# Patient Record
Sex: FEMALE | Race: WHITE | Employment: OTHER | URBAN - METROPOLITAN AREA
[De-identification: names, ages, dates, MRNs, and addresses within clinical notes are randomized per-mention and may not be internally consistent; named-entity substitution may affect disease eponyms.]

---

## 2017-03-16 ENCOUNTER — HOSPITAL ENCOUNTER (OUTPATIENT)
Dept: MAMMOGRAPHY | Facility: HOSPITAL | Age: 82
Discharge: HOME | End: 2017-03-16
Attending: SURGERY
Payer: MEDICARE

## 2017-03-16 DIAGNOSIS — N64.4 BREAST PAIN, RIGHT: ICD-10-CM

## 2017-03-16 PROCEDURE — 77066 DX MAMMO INCL CAD BI: CPT

## 2017-03-16 PROCEDURE — 76642 ULTRASOUND BREAST LIMITED: CPT

## 2017-03-16 PROCEDURE — 77062 BREAST TOMOSYNTHESIS BI: CPT

## 2017-11-14 ENCOUNTER — HOSPITAL ENCOUNTER (OUTPATIENT)
Dept: ULTRASOUND IMAGING | Facility: HOSPITAL | Age: 82
Discharge: HOME | End: 2017-11-14
Attending: FAMILY MEDICINE
Payer: MEDICARE

## 2017-11-14 DIAGNOSIS — R25.2 CRAMPS OF LEFT LOWER EXTREMITY: ICD-10-CM

## 2017-11-14 DIAGNOSIS — G54.0 BRACHIAL PLEXUS DISORDERS: ICD-10-CM

## 2017-11-14 PROCEDURE — 93923 UPR/LXTR ART STDY 3+ LVLS: CPT | Performed by: FAMILY MEDICINE

## 2017-11-20 ENCOUNTER — HOSPITAL ENCOUNTER (OUTPATIENT)
Dept: BONE DENSITY | Age: 82
Discharge: HOME | End: 2017-11-20
Attending: OBSTETRICS & GYNECOLOGY
Payer: MEDICARE

## 2017-11-20 DIAGNOSIS — M81.0 OSTEOPOROSIS: ICD-10-CM

## 2017-11-20 PROCEDURE — 77080 DXA BONE DENSITY AXIAL: CPT | Performed by: OBSTETRICS & GYNECOLOGY

## 2018-05-21 ENCOUNTER — HOSPITAL ENCOUNTER (OUTPATIENT)
Dept: MAMMOGRAPHY | Facility: HOSPITAL | Age: 83
Discharge: HOME | End: 2018-05-21
Attending: SURGERY
Payer: MEDICARE

## 2018-05-21 DIAGNOSIS — N64.4 MASTODYNIA OF RIGHT BREAST: ICD-10-CM

## 2018-05-21 PROCEDURE — 77062 BREAST TOMOSYNTHESIS BI: CPT | Performed by: SURGERY

## 2018-05-21 PROCEDURE — 77066 DX MAMMO INCL CAD BI: CPT | Performed by: SURGERY

## 2018-05-21 PROCEDURE — 76642 ULTRASOUND BREAST LIMITED: CPT | Performed by: SURGERY

## 2018-11-28 ENCOUNTER — HOSPITAL ENCOUNTER (OUTPATIENT)
Dept: ULTRASOUND IMAGING | Facility: HOSPITAL | Age: 83
Discharge: HOME | End: 2018-11-28
Attending: ORTHOPAEDIC SURGERY
Payer: MEDICARE

## 2018-11-28 DIAGNOSIS — R22.41 LOCALIZED SWELLING, MASS AND LUMP, LOWER LIMB, RIGHT: ICD-10-CM

## 2018-11-28 DIAGNOSIS — Z96.641 PRESENCE OF RIGHT ARTIFICIAL HIP JOINT: ICD-10-CM

## 2018-12-03 ENCOUNTER — HOSPITAL ENCOUNTER (OUTPATIENT)
Dept: ULTRASOUND IMAGING | Facility: HOSPITAL | Age: 83
Discharge: HOME | End: 2018-12-03
Attending: ORTHOPAEDIC SURGERY
Payer: MEDICARE

## 2018-12-03 PROCEDURE — 93971 EXTREMITY STUDY: CPT | Performed by: ORTHOPAEDIC SURGERY

## 2018-12-18 ENCOUNTER — TELEPHONE (OUTPATIENT)
Dept: MEDSURG UNIT | Facility: HOSPITAL | Age: 83
End: 2018-12-18

## 2018-12-18 ENCOUNTER — HOSPITAL ENCOUNTER (OUTPATIENT)
Dept: LAB | Facility: HOSPITAL | Age: 83
Discharge: HOME | End: 2018-12-18
Attending: ORTHOPAEDIC SURGERY
Payer: MEDICARE

## 2018-12-18 DIAGNOSIS — Z79.01 LONG TERM (CURRENT) USE OF ANTICOAGULANTS: ICD-10-CM

## 2018-12-18 DIAGNOSIS — Z79.01 LONG TERM (CURRENT) USE OF ANTICOAGULANTS: Primary | ICD-10-CM

## 2018-12-18 DIAGNOSIS — Z86.718 HISTORY OF DVT (DEEP VEIN THROMBOSIS): ICD-10-CM

## 2018-12-18 PROCEDURE — 85610 PROTHROMBIN TIME: CPT

## 2018-12-18 NOTE — TELEPHONE ENCOUNTER
History of DVT. Will be on warfarin for at least 6 months.   INR flow sheet maintained at outside hospital

## 2018-12-21 ENCOUNTER — HOSPITAL ENCOUNTER (OUTPATIENT)
Dept: LAB | Facility: HOSPITAL | Age: 83
Discharge: HOME | End: 2018-12-21
Attending: ORTHOPAEDIC SURGERY
Payer: MEDICARE

## 2018-12-21 DIAGNOSIS — Z86.718 HISTORY OF DVT (DEEP VEIN THROMBOSIS): ICD-10-CM

## 2018-12-21 DIAGNOSIS — Z79.01 LONG TERM (CURRENT) USE OF ANTICOAGULANTS: ICD-10-CM

## 2018-12-21 PROCEDURE — 85610 PROTHROMBIN TIME: CPT

## 2018-12-26 ENCOUNTER — HOSPITAL ENCOUNTER (OUTPATIENT)
Dept: LAB | Facility: HOSPITAL | Age: 83
Discharge: HOME | End: 2018-12-26
Attending: NURSE PRACTITIONER
Payer: MEDICARE

## 2018-12-26 DIAGNOSIS — Z86.718 HISTORY OF DVT (DEEP VEIN THROMBOSIS): ICD-10-CM

## 2018-12-26 DIAGNOSIS — Z79.01 LONG TERM (CURRENT) USE OF ANTICOAGULANTS: ICD-10-CM

## 2018-12-26 PROCEDURE — 85610 PROTHROMBIN TIME: CPT

## 2019-01-02 ENCOUNTER — HOSPITAL ENCOUNTER (OUTPATIENT)
Dept: LAB | Facility: HOSPITAL | Age: 84
Discharge: HOME | End: 2019-01-02
Attending: FAMILY MEDICINE
Payer: MEDICARE

## 2019-01-02 DIAGNOSIS — Z86.718 HISTORY OF DVT (DEEP VEIN THROMBOSIS): ICD-10-CM

## 2019-01-02 DIAGNOSIS — Z79.01 LONG TERM (CURRENT) USE OF ANTICOAGULANTS: ICD-10-CM

## 2019-01-02 LAB — POC INR: 2 (ref 0.8–1.3)

## 2019-01-02 PROCEDURE — 85610 PROTHROMBIN TIME: CPT

## 2019-01-08 ENCOUNTER — HOSPITAL ENCOUNTER (OUTPATIENT)
Dept: LAB | Facility: HOSPITAL | Age: 84
Discharge: HOME | End: 2019-01-08
Attending: NURSE PRACTITIONER
Payer: MEDICARE

## 2019-01-08 DIAGNOSIS — Z86.718 HISTORY OF DVT (DEEP VEIN THROMBOSIS): ICD-10-CM

## 2019-01-08 DIAGNOSIS — Z79.01 LONG TERM (CURRENT) USE OF ANTICOAGULANTS: ICD-10-CM

## 2019-01-08 LAB — POC INR: 1.6 (ref 0.8–1.3)

## 2019-01-08 PROCEDURE — 85610 PROTHROMBIN TIME: CPT

## 2019-01-11 ENCOUNTER — HOSPITAL ENCOUNTER (OUTPATIENT)
Dept: LAB | Facility: HOSPITAL | Age: 84
Discharge: HOME | End: 2019-01-11
Attending: NURSE PRACTITIONER
Payer: MEDICARE

## 2019-01-11 DIAGNOSIS — Z86.718 HISTORY OF DVT (DEEP VEIN THROMBOSIS): ICD-10-CM

## 2019-01-11 DIAGNOSIS — Z79.01 LONG TERM (CURRENT) USE OF ANTICOAGULANTS: ICD-10-CM

## 2019-01-11 LAB — POC INR: 2.1 (ref 0.8–1.3)

## 2019-01-11 PROCEDURE — 85610 PROTHROMBIN TIME: CPT

## 2019-01-15 ENCOUNTER — HOSPITAL ENCOUNTER (OUTPATIENT)
Dept: LAB | Facility: HOSPITAL | Age: 84
Discharge: HOME | End: 2019-01-15
Attending: NURSE PRACTITIONER
Payer: MEDICARE

## 2019-01-15 DIAGNOSIS — Z86.718 HISTORY OF DVT (DEEP VEIN THROMBOSIS): ICD-10-CM

## 2019-01-15 DIAGNOSIS — Z79.01 LONG TERM (CURRENT) USE OF ANTICOAGULANTS: ICD-10-CM

## 2019-01-15 LAB — POC INR: 2.9 (ref 0.8–1.3)

## 2019-01-15 PROCEDURE — 85610 PROTHROMBIN TIME: CPT

## 2019-01-22 ENCOUNTER — HOSPITAL ENCOUNTER (OUTPATIENT)
Dept: LAB | Facility: HOSPITAL | Age: 84
Discharge: HOME | End: 2019-01-22
Attending: NURSE PRACTITIONER
Payer: MEDICARE

## 2019-01-22 DIAGNOSIS — Z79.01 LONG TERM (CURRENT) USE OF ANTICOAGULANTS: ICD-10-CM

## 2019-01-22 DIAGNOSIS — Z86.718 HISTORY OF DVT (DEEP VEIN THROMBOSIS): ICD-10-CM

## 2019-01-22 LAB — POC INR: 1.8 (ref 0.8–1.3)

## 2019-01-22 PROCEDURE — 85610 PROTHROMBIN TIME: CPT

## 2019-01-29 ENCOUNTER — HOSPITAL ENCOUNTER (OUTPATIENT)
Dept: LAB | Facility: HOSPITAL | Age: 84
Discharge: HOME | End: 2019-01-29
Attending: NURSE PRACTITIONER
Payer: MEDICARE

## 2019-01-29 DIAGNOSIS — Z79.01 LONG TERM (CURRENT) USE OF ANTICOAGULANTS: ICD-10-CM

## 2019-01-29 DIAGNOSIS — Z86.718 HISTORY OF DVT (DEEP VEIN THROMBOSIS): ICD-10-CM

## 2019-01-29 LAB — POC INR: 1.7 (ref 0.8–1.3)

## 2019-01-29 PROCEDURE — 85610 PROTHROMBIN TIME: CPT

## 2019-02-05 ENCOUNTER — HOSPITAL ENCOUNTER (OUTPATIENT)
Dept: LAB | Facility: HOSPITAL | Age: 84
Discharge: HOME | End: 2019-02-05
Attending: NURSE PRACTITIONER
Payer: MEDICARE

## 2019-02-05 DIAGNOSIS — Z79.01 LONG TERM (CURRENT) USE OF ANTICOAGULANTS: ICD-10-CM

## 2019-02-05 DIAGNOSIS — Z86.718 HISTORY OF DVT (DEEP VEIN THROMBOSIS): ICD-10-CM

## 2019-02-05 LAB — POC INR: 2.1 (ref 0.8–1.3)

## 2019-02-05 PROCEDURE — 85610 PROTHROMBIN TIME: CPT

## 2019-02-12 ENCOUNTER — HOSPITAL ENCOUNTER (OUTPATIENT)
Dept: LAB | Facility: HOSPITAL | Age: 84
Discharge: HOME | End: 2019-02-12
Attending: NURSE PRACTITIONER
Payer: MEDICARE

## 2019-02-12 DIAGNOSIS — Z79.01 LONG TERM (CURRENT) USE OF ANTICOAGULANTS: ICD-10-CM

## 2019-02-12 DIAGNOSIS — Z86.718 HISTORY OF DVT (DEEP VEIN THROMBOSIS): ICD-10-CM

## 2019-02-12 LAB — POC INR: 2.7 (ref 0.8–1.3)

## 2019-02-12 PROCEDURE — 85610 PROTHROMBIN TIME: CPT

## 2019-02-19 ENCOUNTER — HOSPITAL ENCOUNTER (OUTPATIENT)
Dept: LAB | Facility: HOSPITAL | Age: 84
Discharge: HOME | End: 2019-02-19
Attending: NURSE PRACTITIONER
Payer: MEDICARE

## 2019-02-19 DIAGNOSIS — Z79.01 LONG TERM (CURRENT) USE OF ANTICOAGULANTS: ICD-10-CM

## 2019-02-19 DIAGNOSIS — Z86.718 HISTORY OF DVT (DEEP VEIN THROMBOSIS): ICD-10-CM

## 2019-02-19 LAB — POC INR: 2.3 (ref 0.8–1.3)

## 2019-02-19 PROCEDURE — 85610 PROTHROMBIN TIME: CPT

## 2019-02-26 ENCOUNTER — HOSPITAL ENCOUNTER (OUTPATIENT)
Dept: LAB | Facility: HOSPITAL | Age: 84
Discharge: HOME | End: 2019-02-26
Attending: NURSE PRACTITIONER
Payer: MEDICARE

## 2019-02-26 DIAGNOSIS — Z86.718 HISTORY OF DVT (DEEP VEIN THROMBOSIS): ICD-10-CM

## 2019-02-26 DIAGNOSIS — Z79.01 LONG TERM (CURRENT) USE OF ANTICOAGULANTS: ICD-10-CM

## 2019-02-26 LAB — POC INR: 2.7 (ref 0.8–1.3)

## 2019-02-26 PROCEDURE — 85610 PROTHROMBIN TIME: CPT

## 2019-03-05 ENCOUNTER — HOSPITAL ENCOUNTER (OUTPATIENT)
Dept: LAB | Facility: HOSPITAL | Age: 84
Discharge: HOME | End: 2019-03-05
Attending: NURSE PRACTITIONER
Payer: MEDICARE

## 2019-03-05 DIAGNOSIS — Z86.718 HISTORY OF DVT (DEEP VEIN THROMBOSIS): ICD-10-CM

## 2019-03-05 DIAGNOSIS — Z79.01 LONG TERM (CURRENT) USE OF ANTICOAGULANTS: ICD-10-CM

## 2019-03-05 LAB — POC INR: 2.5 (ref 0.8–1.3)

## 2019-03-05 PROCEDURE — 85610 PROTHROMBIN TIME: CPT

## 2019-03-07 ENCOUNTER — HOSPITAL ENCOUNTER (OUTPATIENT)
Dept: ULTRASOUND IMAGING | Facility: HOSPITAL | Age: 84
Discharge: HOME | End: 2019-03-07
Attending: NURSE PRACTITIONER
Payer: MEDICARE

## 2019-03-07 DIAGNOSIS — Z86.718 HISTORY OF DVT (DEEP VEIN THROMBOSIS): ICD-10-CM

## 2019-03-07 PROCEDURE — 93971 EXTREMITY STUDY: CPT | Performed by: NURSE PRACTITIONER

## 2019-04-09 ENCOUNTER — HOSPITAL ENCOUNTER (OUTPATIENT)
Dept: CV DIAGNOSTICS | Facility: HOSPITAL | Age: 84
Discharge: HOME | End: 2019-04-09
Attending: NURSE PRACTITIONER
Payer: MEDICARE

## 2019-04-09 DIAGNOSIS — I49.9 IRREGULAR HEART BEATS: ICD-10-CM

## 2019-04-09 DIAGNOSIS — R42 EPISODIC LIGHTHEADEDNESS: ICD-10-CM

## 2019-04-09 DIAGNOSIS — R40.4 TRANSIENT ALTERATION OF AWARENESS: ICD-10-CM

## 2019-04-09 PROCEDURE — 93227 XTRNL ECG REC<48 HR R&I: CPT | Performed by: NURSE PRACTITIONER

## 2019-04-09 PROCEDURE — 93226 XTRNL ECG REC<48 HR SCAN A/R: CPT | Performed by: NURSE PRACTITIONER

## 2019-04-09 PROCEDURE — 93225 XTRNL ECG REC<48 HRS REC: CPT | Performed by: NURSE PRACTITIONER

## 2019-05-02 ENCOUNTER — HOSPITAL ENCOUNTER (OUTPATIENT)
Dept: ULTRASOUND IMAGING | Facility: HOSPITAL | Age: 84
Discharge: HOME | End: 2019-05-02
Attending: FAMILY MEDICINE
Payer: MEDICARE

## 2019-05-02 DIAGNOSIS — R42 DIZZINESS: ICD-10-CM

## 2019-05-02 PROCEDURE — 93880 EXTRACRANIAL BILAT STUDY: CPT | Performed by: FAMILY MEDICINE

## 2019-05-15 ENCOUNTER — HOSPITAL ENCOUNTER (OUTPATIENT)
Dept: MAMMOGRAPHY | Facility: HOSPITAL | Age: 84
Discharge: HOME | End: 2019-05-15
Attending: SURGERY
Payer: MEDICARE

## 2019-05-15 DIAGNOSIS — Z12.31 ENCOUNTER FOR SCREENING MAMMOGRAM FOR MALIGNANT NEOPLASM OF BREAST: ICD-10-CM

## 2019-05-15 PROCEDURE — 77067 SCR MAMMO BI INCL CAD: CPT | Performed by: SURGERY

## 2019-05-15 PROCEDURE — 77063 BREAST TOMOSYNTHESIS BI: CPT | Performed by: SURGERY

## 2019-06-25 ENCOUNTER — HOSPITAL ENCOUNTER (OUTPATIENT)
Dept: MRI IMAGING | Age: 84
Discharge: HOME | End: 2019-06-25
Attending: FAMILY MEDICINE
Payer: MEDICARE

## 2019-06-25 DIAGNOSIS — R32 UNSPECIFIED URINARY INCONTINENCE: ICD-10-CM

## 2019-06-25 DIAGNOSIS — R29.898 WEAKNESS OF RIGHT LOWER EXTREMITY: ICD-10-CM

## 2019-06-25 PROCEDURE — 72148 MRI LUMBAR SPINE W/O DYE: CPT | Performed by: FAMILY MEDICINE

## 2019-09-30 PROBLEM — M16.11 DEGENERATIVE JOINT DISEASE OF RIGHT HIP: Status: ACTIVE | Noted: 2018-11-09

## 2019-09-30 PROBLEM — E03.9 ACQUIRED HYPOTHYROIDISM: Status: ACTIVE | Noted: 2018-11-09

## 2019-09-30 PROBLEM — E78.2 MIXED HYPERLIPIDEMIA: Status: ACTIVE | Noted: 2018-11-09

## 2019-09-30 PROBLEM — M81.0 AGE RELATED OSTEOPOROSIS: Status: ACTIVE | Noted: 2018-11-09

## 2019-09-30 PROBLEM — E55.9 VITAMIN D DEFICIENCY: Status: ACTIVE | Noted: 2018-11-09

## 2019-09-30 PROBLEM — K58.9 IBS (IRRITABLE BOWEL SYNDROME): Status: ACTIVE | Noted: 2018-11-09

## 2019-09-30 PROBLEM — M25.551 HIP PAIN, RIGHT: Status: ACTIVE | Noted: 2018-07-10

## 2019-09-30 PROBLEM — K21.9 GASTROESOPHAGEAL REFLUX DISEASE WITHOUT ESOPHAGITIS: Status: ACTIVE | Noted: 2018-11-09

## 2019-09-30 PROBLEM — N90.4 LICHEN SCLEROSUS OF FEMALE GENITALIA: Status: ACTIVE | Noted: 2018-11-09

## 2019-11-21 ENCOUNTER — HOSPITAL ENCOUNTER (OUTPATIENT)
Dept: BONE DENSITY | Age: 84
Discharge: HOME | End: 2019-11-21
Attending: OBSTETRICS & GYNECOLOGY
Payer: MEDICARE

## 2019-11-21 DIAGNOSIS — M81.0 OSTEOPOROSIS: ICD-10-CM

## 2019-11-21 PROCEDURE — 77080 DXA BONE DENSITY AXIAL: CPT | Performed by: OBSTETRICS & GYNECOLOGY

## 2019-12-06 ENCOUNTER — HOSPITAL ENCOUNTER (OUTPATIENT)
Dept: ULTRASOUND IMAGING | Facility: HOSPITAL | Age: 84
Discharge: HOME | DRG: 391 | End: 2019-12-06
Attending: FAMILY MEDICINE
Payer: MEDICARE

## 2019-12-06 DIAGNOSIS — R60.0 BILATERAL LEG EDEMA: ICD-10-CM

## 2019-12-06 PROCEDURE — 93970 EXTREMITY STUDY: CPT | Performed by: FAMILY MEDICINE

## 2019-12-07 ENCOUNTER — APPOINTMENT (OUTPATIENT)
Dept: CT IMAGING | Facility: HOSPITAL | Age: 84
DRG: 391 | End: 2019-12-07
Attending: EMERGENCY MEDICINE
Payer: MEDICARE

## 2019-12-07 ENCOUNTER — HOSPITAL ENCOUNTER (INPATIENT)
Facility: HOSPITAL | Age: 84
LOS: 2 days | Discharge: HOME | DRG: 391 | End: 2019-12-11
Attending: EMERGENCY MEDICINE | Admitting: HOSPITALIST
Payer: MEDICARE

## 2019-12-07 DIAGNOSIS — K56.0 PARALYTIC ILEUS (HCC): Primary | ICD-10-CM

## 2019-12-07 DIAGNOSIS — R11.2 NAUSEA AND VOMITING IN ADULT: ICD-10-CM

## 2019-12-07 DIAGNOSIS — R10.13 ABDOMINAL PAIN, EPIGASTRIC: ICD-10-CM

## 2019-12-07 PROBLEM — E03.9 HYPOTHYROIDISM: Status: ACTIVE | Noted: 2018-11-09

## 2019-12-07 PROCEDURE — 74177 CT ABD & PELVIS W/CONTRAST: CPT | Performed by: EMERGENCY MEDICINE

## 2019-12-07 PROCEDURE — 99223 1ST HOSP IP/OBS HIGH 75: CPT | Performed by: HOSPITALIST

## 2019-12-07 RX ORDER — MORPHINE SULFATE 4 MG/ML
2 INJECTION, SOLUTION INTRAMUSCULAR; INTRAVENOUS EVERY 2 HOUR PRN
Status: DISCONTINUED | OUTPATIENT
Start: 2019-12-07 | End: 2019-12-11

## 2019-12-07 RX ORDER — MORPHINE SULFATE 4 MG/ML
1 INJECTION, SOLUTION INTRAMUSCULAR; INTRAVENOUS EVERY 2 HOUR PRN
Status: DISCONTINUED | OUTPATIENT
Start: 2019-12-07 | End: 2019-12-11

## 2019-12-07 RX ORDER — MORPHINE SULFATE 4 MG/ML
4 INJECTION, SOLUTION INTRAMUSCULAR; INTRAVENOUS EVERY 2 HOUR PRN
Status: DISCONTINUED | OUTPATIENT
Start: 2019-12-07 | End: 2019-12-11

## 2019-12-07 RX ORDER — MORPHINE SULFATE 4 MG/ML
2 INJECTION, SOLUTION INTRAMUSCULAR; INTRAVENOUS ONCE
Status: COMPLETED | OUTPATIENT
Start: 2019-12-07 | End: 2019-12-07

## 2019-12-07 RX ORDER — ACETAMINOPHEN AND CODEINE PHOSPHATE 300; 30 MG/1; MG/1
2 TABLET ORAL EVERY 4 HOURS PRN
Status: DISCONTINUED | OUTPATIENT
Start: 2019-12-07 | End: 2019-12-11

## 2019-12-07 RX ORDER — ONDANSETRON 2 MG/ML
4 INJECTION INTRAMUSCULAR; INTRAVENOUS EVERY 6 HOURS PRN
Status: DISCONTINUED | OUTPATIENT
Start: 2019-12-07 | End: 2019-12-11

## 2019-12-07 RX ORDER — SODIUM CHLORIDE 9 MG/ML
INJECTION, SOLUTION INTRAVENOUS CONTINUOUS
Status: DISCONTINUED | OUTPATIENT
Start: 2019-12-07 | End: 2019-12-11

## 2019-12-07 RX ORDER — ACETAMINOPHEN 325 MG/1
650 TABLET ORAL EVERY 4 HOURS PRN
Status: DISCONTINUED | OUTPATIENT
Start: 2019-12-07 | End: 2019-12-11

## 2019-12-07 RX ORDER — ONDANSETRON 2 MG/ML
4 INJECTION INTRAMUSCULAR; INTRAVENOUS ONCE
Status: COMPLETED | OUTPATIENT
Start: 2019-12-07 | End: 2019-12-07

## 2019-12-07 RX ORDER — ACETAMINOPHEN AND CODEINE PHOSPHATE 300; 30 MG/1; MG/1
1 TABLET ORAL EVERY 4 HOURS PRN
Status: DISCONTINUED | OUTPATIENT
Start: 2019-12-07 | End: 2019-12-11

## 2019-12-07 RX ORDER — MORPHINE SULFATE 4 MG/ML
4 INJECTION, SOLUTION INTRAMUSCULAR; INTRAVENOUS ONCE
Status: DISCONTINUED | OUTPATIENT
Start: 2019-12-07 | End: 2019-12-07

## 2019-12-07 RX ORDER — METOCLOPRAMIDE HYDROCHLORIDE 5 MG/ML
5 INJECTION INTRAMUSCULAR; INTRAVENOUS EVERY 8 HOURS PRN
Status: DISCONTINUED | OUTPATIENT
Start: 2019-12-07 | End: 2019-12-11

## 2019-12-07 RX ORDER — LEVOTHYROXINE SODIUM 88 UG/1
88 TABLET ORAL
Status: DISCONTINUED | OUTPATIENT
Start: 2019-12-08 | End: 2019-12-11

## 2019-12-07 RX ORDER — ENOXAPARIN SODIUM 100 MG/ML
40 INJECTION SUBCUTANEOUS DAILY
Status: DISCONTINUED | OUTPATIENT
Start: 2019-12-08 | End: 2019-12-11

## 2019-12-07 RX ORDER — FAMOTIDINE 20 MG/1
20 TABLET ORAL NIGHTLY
COMMUNITY
End: 2020-01-08 | Stop reason: DRUGHIGH

## 2019-12-07 NOTE — ED PROVIDER NOTES
Patient Seen in: [facility] Emergency Department      History   Patient presents with:  Abdomen/Flank Pain    Stated Complaint: abdominal pain x1 hour    HPI    69-year-old female presents emergency department with a history of abdominal pain since Review of Systems    Positive for stated complaint: abdominal pain x1 hour  Other systems are as noted in HPI. Constitutional and vital signs reviewed. All other systems reviewed and negative except as noted above.     Physical Exam     ED Yimi Lofton WITH PLATELET.   Procedure                               Abnormality         Status                     ---------                               -----------         ------                     CBC W/ DIFFERENTIAL[[id number]]                              Final stable in the emergency department and will be transferred to floor for further definitive care. Patient's questions were answered.   Admission disposition: 12/7/2019  6:48 PM                   Disposition and Plan     Clinical Impression:  Paralytic ileus

## 2019-12-07 NOTE — ED INITIAL ASSESSMENT (HPI)
History of abdominal pain since September 2019. History of IBS and bacteria overgrowth in small intestine. Sudden onset of upper abdominal pain with nausea and some loose stools.

## 2019-12-08 PROCEDURE — 99232 SBSQ HOSP IP/OBS MODERATE 35: CPT | Performed by: HOSPITALIST

## 2019-12-08 RX ORDER — DICYCLOMINE HYDROCHLORIDE 10 MG/1
10 CAPSULE ORAL 3 TIMES DAILY PRN
Status: DISCONTINUED | OUTPATIENT
Start: 2019-12-08 | End: 2019-12-11

## 2019-12-08 NOTE — PROGRESS NOTES
[facility]  Progress Note     Carlos Eduardo Mckeon Patient Status:  Observation    7/15/1935 MRN [id number]   [facility] 3NW-A Attending Adrian Bajwa MD   Hosp Day # 0 PCP Vi Alvarez MD     Chief Complaint: abdominal pain    S: Daily       ASSESSMENT / PLAN:     1. SB enteritis  1. Sip of water okay  2. IVF  3. Pain medications  4. GI consult- follows with Dr. Jluis Lewis  2. H/o bacterial overgrowth  1. Off xifaman  3.  Hypothyroidism  1. synthroid    Plan of care: as above    Quality

## 2019-12-08 NOTE — H&P
[facility]  History and Physical     Dhaval Castro Patient Status:  Emergency    7/15/1935 MRN [id number]   Location [address] [facility] Attending Zonia Jordan MD   Hosp Day # 0 PCP Sydney Pitt MD     Chief Complaint: a HISTORY  15+    nose sx   • OTHER SURGICAL HISTORY      toe sx   • OTHER SURGICAL HISTORY      elbox sx   • SIGMOIDOSCOPY,DIAGNOSTIC     • TONSILLECTOMY     • TOTAL ABDOM HYSTERECTOMY  1986     Social History:  reports that she has never smoked.  She has ne /67   Pulse 83   Temp 97.6 °F (36.4 °C) (Temporal)   Resp 14   Ht 160 cm (5' 3\")   Wt 120 lb (54.4 kg)   SpO2 100%   BMI 21.26 kg/m²   General: No acute distress. Alert and oriented x 3. HEENT: Normocephalic, atraumatic. EOM-I. Anicteric.   Neck:

## 2019-12-08 NOTE — CONSULTS
[facility] Note:  Renal Dose Adjustment for Metoclopramide (REGLAN)    Debo Sullivan has been prescribed Metoclopramide (REGLAN) 10 mg every 8 hours as needed for n/v.    Estimated Creatinine Clearance: 33.6 mL/min (A) (based on SCr of 1.03 mg/dL (H

## 2019-12-08 NOTE — CONSULTS
[facility]                       [facility]    [facility]:  [facility]ation    7/15/1935 MRN [id number]   [facility] 3NW-A Attending Sheri Dickson MD   H History:   Procedure Laterality Date   • COLONOSCOPY  2015   • EGD     • ERCP,DIAGNOSTIC     • HIP REPLACEMENT SURGERY  11/13/2018   • HYSTERECTOMY  1985   • OOPHORECTOMY  1985   • OTHER SURGICAL HISTORY  15+    nose sx   • OTHER SURGICAL HISTORY      toe long after you took penicillin did the             reaction start?  Almost within 8 hours4.  What             happened when you took penicillin?  Anaphylactic             reaction5.  Since your reaction, have you taken             other beta-lactam antibiot (54.4 kg)   SpO2 100%   BMI 21.26 kg/m²   Gen: AAO x 3, able to speak in complete sentences  HENT: NCAT, EOMI, PERRL, oropharynx is clear with moist mucosal membranes  Eyes: Sclerae are anicteric  Neck:  Supple without nuchal rigidity;  No lymphadenopathy  Diverticulosis, Colon polyp (tubular adenoma), Colonic mucosa with increased eosinophils, suggestive of chronic colitis    Impression:   1. Abdominal pain with CT showing enteritis  2. H/o SIBO  3. Weight loss    Recommendations:   1.  Recommend GI stool p

## 2019-12-08 NOTE — PLAN OF CARE
Patient admitted via cart from the ED. Stable condition. Admission navigator completed. POC discussed. Oriented to room. Safety precautions initiated. Bed in low position. Call light in reach. Pt alert and oriented x 4.  Up with assist. Voiding absence of nausea and vomiting  Description  INTERVENTIONS:  - Maintain adequate hydration with IV or PO as ordered and tolerated  - Nasogastric tube to low intermittent suction as ordered  - Evaluate effectiveness of ordered antiemetic medications  - Prov

## 2019-12-08 NOTE — PLAN OF CARE
Problem: Patient/Family Goals  Goal: Patient/Family Long Term Goal  Description  Patient's Long Term Goal:    Interventions:  -   - See additional Care Plan goals for specific interventions  Outcome: Progressing  Goal: Patient/Family Short Term Goal  Dragan Establish a toileting routine/schedule  - Consider collaborating with pharmacy to review patient's medication profile  Outcome: Progressing  VSS afebrile. Denies nausea or emesis. States abdomen pain much less severe rating it 3-4/10.   Declined pain medic

## 2019-12-08 NOTE — ED NOTES
Patient is resting comfortably; denies pain/bertram/nausea. Adult family at bs. Awaiting inpt room assignment.

## 2019-12-09 ENCOUNTER — APPOINTMENT (OUTPATIENT)
Dept: CT IMAGING | Facility: HOSPITAL | Age: 84
DRG: 391 | End: 2019-12-09
Attending: NURSE PRACTITIONER
Payer: MEDICARE

## 2019-12-09 ENCOUNTER — APPOINTMENT (OUTPATIENT)
Dept: GENERAL RADIOLOGY | Facility: HOSPITAL | Age: 84
DRG: 391 | End: 2019-12-09
Attending: NURSE PRACTITIONER
Payer: MEDICARE

## 2019-12-09 PROCEDURE — 74177 CT ABD & PELVIS W/CONTRAST: CPT | Performed by: NURSE PRACTITIONER

## 2019-12-09 PROCEDURE — 99232 SBSQ HOSP IP/OBS MODERATE 35: CPT | Performed by: HOSPITALIST

## 2019-12-09 PROCEDURE — 74019 RADEX ABDOMEN 2 VIEWS: CPT | Performed by: NURSE PRACTITIONER

## 2019-12-09 NOTE — DIETARY MALNUTRITION NOTE
[facility]    NUTRITION INITIAL ASSESSMENT    Pt meets moderate (chronic) malnutrition criteria.     CRITERIA FOR MALNUTRITION DIAGNOSIS:  Criteria for non-severe malnutrition diagnosis: chronic illness related to energy intake less than75% for greater month  Intake Meeting Needs: No  Food Allergies: No  Cultural/Ethnic/Baptism Preferences Addresses: Yes    NUTRITION RELATED PHYSICAL FINDINGS:     1. Body Fat/Muscle Mass: mild depletion body fat and mild depletion muscle mass per visual exam.     2. Fl

## 2019-12-09 NOTE — PROGRESS NOTES
Gastroenterology Progress Note  Deuelfabienne Garay Patient Status:  Observation    7/15/1935 MRN [id number]   [facility] 3NW-A Attending Oneal Hewitt MD   [address] Day # 0 PCP Ramona Ruiz MD tolerated; out of bed throughout day   4. Antiemetics as needed; pain meds per PCP recommendations-limiting narcotics as able  5. Outpatient glucose breath test to assess for ongoing SIBO--was already ordered in outpt setting   6.  Continue dicyclomine PRN

## 2019-12-09 NOTE — PROGRESS NOTES
[facility]  Progress Note     [address] Patient Status:  Observation    7/15/1935 MRN [id number]   [facility] 3NW-A Attending Roge Daniel MD   Hosp Day # 0 PCP Rogerio Pérez MD     Chief Complaint: abdominal pain    S: Daily       ASSESSMENT / PLAN:     1. SB enteritis  1. On clear liquids  2. GI on consult  3. Plan for Obs series and possible CTE to assess small bowel  4. IVF  5. Pain medications  2. H/o bacterial overgrowth  1. Off xifaman  3.  Hypothyroidism  1. synthr

## 2019-12-09 NOTE — PLAN OF CARE
ABD SOFT AND DISTENDED. C/O C/O OF MILD ABD PAIN. DECLINED PAIN MEDS. REPORTS FLATUS ONCE ONLY. TOLERATING CL DIET. AMBULATING. POC UPDATED, PT VERBALIZED UNDERSTANDING.

## 2019-12-09 NOTE — PLAN OF CARE
Problem: PAIN - ADULT  Goal: Verbalizes/displays adequate comfort level or patient's stated pain goal  Description  INTERVENTIONS:  - Encourage pt to monitor pain and request assistance  - Assess pain using appropriate pain scale  - Administer analgesics b Waiting for stool sample. Pt is resting, call light in reach, will continue to monitor.

## 2019-12-10 PROCEDURE — 99232 SBSQ HOSP IP/OBS MODERATE 35: CPT | Performed by: HOSPITALIST

## 2019-12-10 NOTE — DIETARY MALNUTRITION NOTE
[facility]    NUTRITION INITIAL ASSESSMENT    Pt meets moderate (chronic) malnutrition criteria.     CRITERIA FOR MALNUTRITION DIAGNOSIS:  Criteria for non-severe malnutrition diagnosis: chronic illness related to energy intake less than75% for greater lb)  08/11/15 : 63.5 kg (140 lb)      NUTRITION:  Diet: Low fiber/Soft   Oral Supplements: n/a    FOOD/NUTRITION RELATED HISTORY:  Appetite: Poor and Fair  Intake: <75% x 1 month  Intake Meeting Needs: No  Food Allergies: No  Cultural/Ethnic/Amish Pref

## 2019-12-10 NOTE — PLAN OF CARE
Diet advanced to soft, pt ate only scrambled eggs, food choices limited as she follows FODMAP diet. Feels bloated & taking diet slowly, will order dinner but pt does not feel ready for discharge home tonight, afraid cramping may start again.

## 2019-12-10 NOTE — PROGRESS NOTES
Gastroenterology Progress Note  Christa Calle Patient Status:  Observation    7/15/1935 MRN [id number]   [facility] 3NW-A Attending Pilo Dawn MD   Hosp Day # 1 PCP Keysha Mendez MD

## 2019-12-10 NOTE — CDS QUERY
Potential for Impaired Nutritional Status  DOCUMENTATION CLARIFICATION FORM  Dear Doctor:  Clinical information documented by the Clinical Dietician suggests potential for impaired nutritional status.  For accurate ICD-10-CM code assignment to reflect sever Clinical Documentation :   Michael Santoyo RN CCDS x [id number]                          Thank you!                                                          THIS FORM IS A PERMANENT PART OF THE MEDICAL RECORD

## 2019-12-10 NOTE — PROGRESS NOTES
[facility]  Progress Note     Michelle Mariscal Patient Status:  Observation    7/15/1935 MRN [id number]   [facility] 3NW-A Attending Neena Rubin MD   Hosp Day # 1 PCP Neftali Davis MD     Chief Complaint: abdominal pain    S: data reviewed in Epic. Medications:   • Levothyroxine Sodium  88 mcg Oral Before breakfast   • enoxaparin  40 mg Subcutaneous Daily       ASSESSMENT / PLAN:     1. SB enteritis  1. On clear liquids  2. GI on consult  3.  CTE showing ileus and enteritis

## 2019-12-10 NOTE — PLAN OF CARE
Patient alert and oriented, resting comfortably in bed. CT scan performed tonight. Patient has had small bowel movments that have not been able to be collected, need to collect stool remains. No cough or SOB at this time.   Minimal stomach pain, dist pharmacy to review patient's medication profile  Outcome: Progressing

## 2019-12-11 VITALS
DIASTOLIC BLOOD PRESSURE: 51 MMHG | RESPIRATION RATE: 18 BRPM | BODY MASS INDEX: 21.26 KG/M2 | SYSTOLIC BLOOD PRESSURE: 123 MMHG | TEMPERATURE: 97 F | HEART RATE: 70 BPM | OXYGEN SATURATION: 95 % | HEIGHT: 63 IN | WEIGHT: 120 LBS

## 2019-12-11 PROCEDURE — 99239 HOSP IP/OBS DSCHRG MGMT >30: CPT | Performed by: HOSPITALIST

## 2019-12-11 RX ORDER — DICYCLOMINE HYDROCHLORIDE 10 MG/1
10 CAPSULE ORAL 3 TIMES DAILY PRN
Qty: 90 CAPSULE | Refills: 0 | Status: SHIPPED | OUTPATIENT
Start: 2019-12-11 | End: 2021-12-07 | Stop reason: ALTCHOICE

## 2019-12-11 NOTE — PLAN OF CARE
Patient alert and oriented x4. Denies pain. Denies nausea. States tolerating diet. Passing gas. States had small BM this AM, not seen by this RN. States that she still feels bloated, but bloating is improving. Pt is up ambulating independently. Voiding.  IV Evaluate effectiveness of ordered antiemetic medications  - Provide nonpharmacologic comfort measures as appropriate  - Advance diet as tolerated, if ordered  - Obtain nutritional consult as needed  - Evaluate fluid balance  Outcome: Progressing  Goal: Mata Catalan

## 2019-12-11 NOTE — PROGRESS NOTES
Patient discharged home at this time. All discharge instructions were reviewed with the patient. All questions and concerns were addressed. IV access was removed. Pt denied need for wheelchair, ambulated off unit with this RN, gait steady.  Pt left in stabl

## 2019-12-11 NOTE — PLAN OF CARE
Patient is alert and oriented. States that abdominal pain has improved and bloating is less. Ambulating to the restroom without assistance. No sob or cough, lungs are clear. No n/v/d.  VSS, will continue to monitor.      Problem: PAIN - ADULT  Goal: Sulaiman

## 2019-12-11 NOTE — PROGRESS NOTES
[facility]  Progress Note     Severiano Grieves Patient Status:  Observation    7/15/1935 MRN [id number]   [facility] 3NW-A Attending Michell Izaguirre MD   Hosp Day # 2 PCP Ricky Brown MD     Chief Complaint: abdominal pain    S: Levothyroxine Sodium  88 mcg Oral Before breakfast   • enoxaparin  40 mg Subcutaneous Daily       ASSESSMENT / PLAN:     1. SB enteritis  1. On clear liquids  2. GI on consult  3. CTE showing ileus and enteritis  4. Tolerating diet  2.  H/o bacterial overgr

## 2019-12-12 NOTE — DISCHARGE SUMMARY
[facility]  DISCHARGE SUMMARY     [address] Patient Status:  Inpatient    7/15/1935 MRN [id number]   [facility] 3NW-A Attending No att. providers found   Hosp Day # 2 PCP Rogerio Pérez MD     Date of Admission: 2019 Discharge:   · none    Consultants:  • Gastroenterology    Discharge Medication List:     Discharge Medications      START taking these medications      Instructions Prescription details   Dicyclomine HCl 10 MG Caps  Commonly known as:  BENTYL      Take 1 °C)  Pulse:  [70] 70  Resp:  [18] 18  BP: (123)/(51) 123/51    Physical Exam:    General: No acute distress. Respiratory: Clear to auscultation bilaterally. No wheezes. No rhonchi. Cardiovascular: S1, S2. Regular rate and rhythm.  No murmurs, rubs or gal

## 2020-01-23 ENCOUNTER — HOSPITAL ENCOUNTER (OUTPATIENT)
Dept: MRI IMAGING | Facility: HOSPITAL | Age: 85
Discharge: HOME | End: 2020-01-23
Attending: NURSE PRACTITIONER
Payer: MEDICARE

## 2020-01-23 DIAGNOSIS — R93.89 ABNORMAL FINDING ON IMAGING: ICD-10-CM

## 2020-01-23 DIAGNOSIS — K52.9 ENTERITIS: ICD-10-CM

## 2020-01-23 PROCEDURE — 72197 MRI PELVIS W/O & W/DYE: CPT | Performed by: NURSE PRACTITIONER

## 2020-01-23 PROCEDURE — A9575 INJ GADOTERATE MEGLUMI 0.1ML: HCPCS | Performed by: NURSE PRACTITIONER

## 2020-01-23 PROCEDURE — 74183 MRI ABD W/O CNTR FLWD CNTR: CPT | Performed by: NURSE PRACTITIONER

## 2020-03-01 ENCOUNTER — APPOINTMENT (OUTPATIENT)
Dept: LAB | Facility: HOSPITAL | Age: 85
End: 2020-03-01
Attending: INTERNAL MEDICINE
Payer: MEDICARE

## 2020-03-01 DIAGNOSIS — R10.9 ABDOMINAL PAIN, UNSPECIFIED ABDOMINAL LOCATION: ICD-10-CM

## 2020-03-01 DIAGNOSIS — R14.0 BLOATING: ICD-10-CM

## 2020-03-01 DIAGNOSIS — Z86.010 HISTORY OF ADENOMATOUS POLYP OF COLON: ICD-10-CM

## 2020-03-01 PROCEDURE — 82274 ASSAY TEST FOR BLOOD FECAL: CPT

## 2020-05-20 ENCOUNTER — HOSPITAL ENCOUNTER (OUTPATIENT)
Dept: MAMMOGRAPHY | Facility: HOSPITAL | Age: 85
Discharge: HOME | End: 2020-05-20
Attending: SURGERY
Payer: MEDICARE

## 2020-05-20 DIAGNOSIS — Z12.31 ENCOUNTER FOR SCREENING MAMMOGRAM FOR MALIGNANT NEOPLASM OF BREAST: ICD-10-CM

## 2020-05-20 PROCEDURE — 77067 SCR MAMMO BI INCL CAD: CPT | Performed by: SURGERY

## 2020-05-20 PROCEDURE — 77063 BREAST TOMOSYNTHESIS BI: CPT | Performed by: SURGERY

## 2020-07-03 ENCOUNTER — APPOINTMENT (OUTPATIENT)
Dept: LAB | Facility: HOSPITAL | Age: 85
End: 2020-07-03
Attending: INTERNAL MEDICINE
Payer: MEDICARE

## 2020-07-03 DIAGNOSIS — R19.5 LOOSE STOOLS: ICD-10-CM

## 2020-07-03 PROCEDURE — 87493 C DIFF AMPLIFIED PROBE: CPT

## 2020-07-04 LAB — C DIFF TOX B STL QL: NEGATIVE

## 2020-07-09 ENCOUNTER — LAB ENCOUNTER (OUTPATIENT)
Dept: LAB | Facility: HOSPITAL | Age: 85
End: 2020-07-09
Attending: INTERNAL MEDICINE
Payer: MEDICARE

## 2020-07-09 DIAGNOSIS — K21.9 GASTROESOPHAGEAL REFLUX DISEASE WITHOUT ESOPHAGITIS: ICD-10-CM

## 2020-07-09 DIAGNOSIS — R10.9 ABDOMINAL BLOATING WITH CRAMPS: ICD-10-CM

## 2020-07-09 DIAGNOSIS — R14.0 ABDOMINAL BLOATING WITH CRAMPS: ICD-10-CM

## 2020-07-09 DIAGNOSIS — R63.4 WEIGHT LOSS: ICD-10-CM

## 2020-07-09 LAB
ALBUMIN SERPL-MCNC: 3.3 G/DL (ref 3.4–5)
ALBUMIN/GLOB SERPL: 1 {RATIO} (ref 1–2)
ALP LIVER SERPL-CCNC: 64 U/L (ref 55–142)
ALT SERPL-CCNC: 20 U/L (ref 13–56)
ANION GAP SERPL CALC-SCNC: 1 MMOL/L (ref 0–18)
AST SERPL-CCNC: 18 U/L (ref 15–37)
BASOPHILS # BLD AUTO: 0.02 X10(3) UL (ref 0–0.2)
BASOPHILS NFR BLD AUTO: 0.4 %
BILIRUB SERPL-MCNC: 0.9 MG/DL (ref 0.1–2)
BUN BLD-MCNC: 19 MG/DL (ref 7–18)
BUN/CREAT SERPL: 20 (ref 10–20)
CALCIUM BLD-MCNC: 9.4 MG/DL (ref 8.5–10.1)
CHLORIDE SERPL-SCNC: 106 MMOL/L (ref 98–112)
CO2 SERPL-SCNC: 32 MMOL/L (ref 21–32)
CREAT BLD-MCNC: 0.95 MG/DL (ref 0.55–1.02)
DEPRECATED RDW RBC AUTO: 43.8 FL (ref 35.1–46.3)
EOSINOPHIL # BLD AUTO: 0.12 X10(3) UL (ref 0–0.7)
EOSINOPHIL NFR BLD AUTO: 2.2 %
ERYTHROCYTE [DISTWIDTH] IN BLOOD BY AUTOMATED COUNT: 12.9 % (ref 11–15)
GLOBULIN PLAS-MCNC: 3.2 G/DL (ref 2.8–4.4)
GLUCOSE BLD-MCNC: 83 MG/DL (ref 70–99)
HCT VFR BLD AUTO: 42.5 % (ref 35–48)
HGB BLD-MCNC: 13.4 G/DL (ref 12–16)
IMM GRANULOCYTES # BLD AUTO: 0.01 X10(3) UL (ref 0–1)
IMM GRANULOCYTES NFR BLD: 0.2 %
LYMPHOCYTES # BLD AUTO: 1.33 X10(3) UL (ref 1–4)
LYMPHOCYTES NFR BLD AUTO: 24.5 %
M PROTEIN MFR SERPL ELPH: 6.5 G/DL (ref 6.4–8.2)
MCH RBC QN AUTO: 29.3 PG (ref 26–34)
MCHC RBC AUTO-ENTMCNC: 31.5 G/DL (ref 31–37)
MCV RBC AUTO: 92.8 FL (ref 80–100)
MONOCYTES # BLD AUTO: 0.62 X10(3) UL (ref 0.1–1)
MONOCYTES NFR BLD AUTO: 11.4 %
NEUTROPHILS # BLD AUTO: 3.32 X10 (3) UL (ref 1.5–7.7)
NEUTROPHILS # BLD AUTO: 3.32 X10(3) UL (ref 1.5–7.7)
NEUTROPHILS NFR BLD AUTO: 61.3 %
OSMOLALITY SERPL CALC.SUM OF ELEC: 289 MOSM/KG (ref 275–295)
PATIENT FASTING Y/N/NP: YES
PLATELET # BLD AUTO: 164 10(3)UL (ref 150–450)
POTASSIUM SERPL-SCNC: 3.9 MMOL/L (ref 3.5–5.1)
RBC # BLD AUTO: 4.58 X10(6)UL (ref 3.8–5.3)
SODIUM SERPL-SCNC: 139 MMOL/L (ref 136–145)
WBC # BLD AUTO: 5.4 X10(3) UL (ref 4–11)

## 2020-07-09 PROCEDURE — 85025 COMPLETE CBC W/AUTO DIFF WBC: CPT

## 2020-07-09 PROCEDURE — 82656 EL-1 FECAL QUAL/SEMIQ: CPT

## 2020-07-09 PROCEDURE — 80053 COMPREHEN METABOLIC PANEL: CPT

## 2020-07-09 PROCEDURE — 82705 FATS/LIPIDS FECES QUAL: CPT

## 2020-07-09 PROCEDURE — 36415 COLL VENOUS BLD VENIPUNCTURE: CPT

## 2020-07-11 LAB
NEUTRAL FAT, FECAL: NORMAL
SPLIT FAT, FECAL: NORMAL

## 2020-07-12 LAB — PANCREATIC ELASTASE , FECAL: 472 UG/G

## 2020-09-03 ENCOUNTER — OFFICE VISIT (OUTPATIENT)
Dept: NUTRITION | Facility: HOSPITAL | Age: 85
End: 2020-09-03
Attending: FAMILY MEDICINE
Payer: MEDICARE

## 2020-09-03 PROCEDURE — 97802 MEDICAL NUTRITION INDIV IN: CPT

## 2020-09-03 NOTE — PROGRESS NOTES
ADULT INITIAL OUTPATIENT NUTRITION CONSULTATION    Nutrition Assessment    Medical Diagnosis: IBS    PMH: SIBO, Gerd, osteoporosis    Client Hx: 80year old female    Meds: synthroid    Labs: no recent    ANTHROPOMETRICS:  Ht: 5'2\"  Wt: 114#  BMI: 20.8 groups into her diet. Pt has set goals to follow recommendations set today and to contact RD with further questions or concerns.      GOALS/PLAN:  1-begin reintroduction phase, 1 group per week  2-food and symptom diary  3-prevent weight loss  4-reduce symp

## 2020-09-11 ENCOUNTER — LAB ENCOUNTER (OUTPATIENT)
Dept: LAB | Facility: HOSPITAL | Age: 85
End: 2020-09-11
Attending: INTERNAL MEDICINE
Payer: MEDICARE

## 2020-09-11 DIAGNOSIS — R63.4 WEIGHT LOSS: ICD-10-CM

## 2020-09-11 LAB
IGA SERPL-MCNC: 183 MG/DL (ref 70–312)
T4 FREE SERPL-MCNC: 1.4 NG/DL (ref 0.8–1.7)
TSI SER-ACNC: 1.11 MIU/ML (ref 0.36–3.74)

## 2020-09-11 PROCEDURE — 82784 ASSAY IGA/IGD/IGG/IGM EACH: CPT

## 2020-09-11 PROCEDURE — 84443 ASSAY THYROID STIM HORMONE: CPT

## 2020-09-11 PROCEDURE — 84439 ASSAY OF FREE THYROXINE: CPT

## 2020-09-11 PROCEDURE — 83516 IMMUNOASSAY NONANTIBODY: CPT

## 2020-09-11 PROCEDURE — 36415 COLL VENOUS BLD VENIPUNCTURE: CPT

## 2020-09-14 LAB — TTG IGA SER-ACNC: 0.2 U/ML (ref ?–7)

## 2021-06-14 ENCOUNTER — HOSPITAL ENCOUNTER (OUTPATIENT)
Dept: MAMMOGRAPHY | Facility: HOSPITAL | Age: 86
Discharge: HOME | End: 2021-06-14
Attending: SURGERY
Payer: MEDICARE

## 2021-06-14 DIAGNOSIS — Z12.31 ENCOUNTER FOR SCREENING MAMMOGRAM FOR MALIGNANT NEOPLASM OF BREAST: ICD-10-CM

## 2021-06-14 PROCEDURE — 77063 BREAST TOMOSYNTHESIS BI: CPT | Performed by: SURGERY

## 2021-06-14 PROCEDURE — 77067 SCR MAMMO BI INCL CAD: CPT | Performed by: SURGERY

## 2021-06-16 ENCOUNTER — HOSPITAL ENCOUNTER (OUTPATIENT)
Dept: MAMMOGRAPHY | Facility: HOSPITAL | Age: 86
Discharge: HOME | End: 2021-06-16
Attending: SURGERY
Payer: MEDICARE

## 2021-06-16 DIAGNOSIS — R92.2 INCONCLUSIVE MAMMOGRAM: ICD-10-CM

## 2021-06-16 PROCEDURE — 77061 BREAST TOMOSYNTHESIS UNI: CPT | Performed by: SURGERY

## 2021-06-16 PROCEDURE — 77065 DX MAMMO INCL CAD UNI: CPT | Performed by: SURGERY

## 2021-11-22 ENCOUNTER — HOSPITAL ENCOUNTER (OUTPATIENT)
Dept: BONE DENSITY | Age: 86
Discharge: HOME | End: 2021-11-22
Attending: FAMILY MEDICINE
Payer: MEDICARE

## 2021-11-22 DIAGNOSIS — M81.0 OSTEOPOROSIS: ICD-10-CM

## 2021-11-22 PROCEDURE — 77080 DXA BONE DENSITY AXIAL: CPT | Performed by: FAMILY MEDICINE

## 2022-06-14 PROBLEM — R15.9 FULL INCONTINENCE OF FECES: Status: ACTIVE | Noted: 2022-06-14

## 2022-06-14 PROBLEM — M62.89 PELVIC FLOOR DYSFUNCTION: Status: ACTIVE | Noted: 2022-06-14

## 2022-06-14 PROBLEM — K44.9 HIATAL HERNIA: Status: ACTIVE | Noted: 2022-06-14

## 2022-06-16 ENCOUNTER — HOSPITAL ENCOUNTER (OUTPATIENT)
Dept: MAMMOGRAPHY | Facility: HOSPITAL | Age: 87
Discharge: HOME | End: 2022-06-16
Attending: SURGERY
Payer: MEDICARE

## 2022-06-16 DIAGNOSIS — Z12.31 ENCOUNTER FOR SCREENING MAMMOGRAM FOR MALIGNANT NEOPLASM OF BREAST: ICD-10-CM

## 2022-06-16 DIAGNOSIS — Z87.898 HISTORY OF ABNORMAL MAMMOGRAM: ICD-10-CM

## 2022-06-16 PROCEDURE — 76642 ULTRASOUND BREAST LIMITED: CPT | Performed by: SURGERY

## 2022-06-16 PROCEDURE — 77066 DX MAMMO INCL CAD BI: CPT | Performed by: SURGERY

## 2022-06-16 PROCEDURE — 77062 BREAST TOMOSYNTHESIS BI: CPT | Performed by: SURGERY

## 2022-12-05 NOTE — PLAN OF CARE
PE w/o concerns  Histories reviewed  Labs ordered  Discussed exercise on a regular basis 3-5 times weekly  Recommend healthy diet that is low on carbs     Problem: PAIN - ADULT  Goal: Verbalizes/displays adequate comfort level or patient's stated pain goal  Description  INTERVENTIONS:  - Encourage pt to monitor pain and request assistance  - Assess pain using appropriate pain scale  - Administer analgesics meds & plan of care, voices understanding.

## 2023-05-12 ENCOUNTER — HOSPITAL ENCOUNTER (OUTPATIENT)
Dept: GENERAL RADIOLOGY | Facility: HOSPITAL | Age: 88
Discharge: HOME | End: 2023-05-12
Attending: INTERNAL MEDICINE
Payer: MEDICARE

## 2023-05-12 DIAGNOSIS — R10.84 GENERALIZED ABDOMINAL PAIN: ICD-10-CM

## 2023-05-12 DIAGNOSIS — R19.7 DIARRHEA, UNSPECIFIED TYPE: ICD-10-CM

## 2023-05-12 PROCEDURE — 74018 RADEX ABDOMEN 1 VIEW: CPT | Performed by: INTERNAL MEDICINE

## 2023-06-17 ENCOUNTER — HOSPITAL ENCOUNTER (OUTPATIENT)
Dept: MAMMOGRAPHY | Facility: HOSPITAL | Age: 88
Discharge: HOME | End: 2023-06-17
Attending: SURGERY
Payer: MEDICARE

## 2023-06-17 ENCOUNTER — ORDER TRANSCRIPTION (OUTPATIENT)
Dept: ADMINISTRATIVE | Facility: HOSPITAL | Age: 88
End: 2023-06-17

## 2023-06-17 DIAGNOSIS — Z12.31 ENCOUNTER FOR SCREENING MAMMOGRAM FOR MALIGNANT NEOPLASM OF BREAST: ICD-10-CM

## 2023-06-17 DIAGNOSIS — Z12.31 ENCOUNTER FOR SCREENING MAMMOGRAM FOR MALIGNANT NEOPLASM OF BREAST: Primary | ICD-10-CM

## 2023-06-17 PROCEDURE — 77063 BREAST TOMOSYNTHESIS BI: CPT | Performed by: SURGERY

## 2023-06-17 PROCEDURE — 77067 SCR MAMMO BI INCL CAD: CPT | Performed by: SURGERY

## 2023-11-06 ENCOUNTER — OFFICE VISIT (OUTPATIENT)
Facility: LOCATION | Age: 88
End: 2023-11-06
Payer: MEDICARE

## 2023-11-06 DIAGNOSIS — H90.3 SENSORY HEARING LOSS, BILATERAL: ICD-10-CM

## 2023-11-06 DIAGNOSIS — H69.93 UNSPECIFIED EUSTACHIAN TUBE DISORDER, BILATERAL: Primary | ICD-10-CM

## 2023-11-06 DIAGNOSIS — H93.291 ABNORMAL AUDITORY PERCEPTION OF RIGHT EAR: Primary | ICD-10-CM

## 2023-11-06 PROCEDURE — 99204 OFFICE O/P NEW MOD 45 MIN: CPT | Performed by: OTOLARYNGOLOGY

## 2023-11-06 NOTE — PROGRESS NOTES
Archana Styles was seen for an audiometric evaluation and tympanogram today. Referred back to physician. Hearing aid evaluation recommended.     Santos Jay MS, CCC-A

## 2023-11-06 NOTE — PROGRESS NOTES
[facility], [facility] [facility]akely    Report of Consultation    Date of Consult: 11/6/2023     Reason for Consultation:   Suzette Moneg in her right ear. History of Present Illness:   Patient is a 80year old female who is being seen for plugging in her right ear. Patient always feels if she needs to pop her right ear. She has been told in the past that she has eustachian tube dysfunction. Denies fevers chills nausea or vomiting. She has had no otorrhea. No prior history of ear surgery head trauma. She has not had significant noise exposure.     Past Medical History  Past Medical History:   Diagnosis Date    Abdominal distention     Abdominal pain     Atypical mole 1999    Back pain Lower back 2020    Bad breath     Belching     Bloating     Change in hair 1995    Diarrhea, unspecified     Dizziness 2018    Easy bruising     Enlarged lymph node 2018 jaw    Eye disease     Flatulence/gas pain/belching     Food intolerance     Frequent urination     Headache disorder 2018    Hearing loss     Heart palpitations     High cholesterol     Hoarseness, chronic 2018    Indigestion 2010    Irregular bowel habits     Leaking of urine     Leg swelling     Nausea 2019    Osteoporosis     Pain in joints hip replacement 2018    Rash     Sleep disturbance 2019    Stool incontinence     Stress     Thyroid disease     Vomiting 2019    Wears glasses     Weight loss 2019    Lost 25-30 lbs       Past Surgical History  Past Surgical History:   Procedure Laterality Date    COLONOSCOPY  2015    EGD      ERCP,DIAGNOSTIC      HIP REPLACEMENT SURGERY  11/13/2018    HYSTERECTOMY  1985    OOPHORECTOMY  1985    OTHER SURGICAL HISTORY  15+    nose sx    OTHER SURGICAL HISTORY      toe sx    OTHER SURGICAL HISTORY      elbox sx    SIGMOIDOSCOPY,DIAGNOSTIC      TONSILLECTOMY      TOTAL ABDOM HYSTERECTOMY  1986       Family History  Family History   Problem Relation Age of Onset    Breast Cancer Maternal Grandmother Breast Cancer Maternal Aunt     Colon Cancer Maternal Aunt     Uterine Cancer Mother     Other (Other) Father         ALS       Social History  Patient Guardians:  Not on file    Other Topics            Concern    None on file    Social History Narrative    None on file            Current Medications:  Current Outpatient Medications   Medication Sig Dispense Refill    clindamycin 300 MG Oral Cap TAKE 2 CAPSULES BY MOUTH 30-60 MINUTES BEFORE PROCEDURE      XIFAXAN 550 MG Oral Tab       dicyclomine 10 MG Oral Cap Take 1 capsule (10 mg total) by mouth in the morning, at noon, and at bedtime. 12 capsule 0    dicyclomine 10 MG Oral Cap Take 1 capsule (10 mg total) by mouth 3 (three) times daily as needed. FAMOTIDINE 20 MG Oral Tab TAKE 1 TABLET(20 MG) BY MOUTH TWICE DAILY 60 tablet 5    SYNTHROID 88 MCG Oral Tab Take 1 tablet (88 mcg total) by mouth before breakfast.      Vitamin B-12 1000 MCG Oral Tab Take 1 tablet (1,000 mcg total) by mouth daily. Calcium Citrate-Vitamin D 315-250 MG-UNIT Oral Tab Take 1 tablet by mouth daily. Allergies    Clemizole               ANAPHYLAXIS    Comment:1. How long ago did you have a reaction to             penicillin? Over 30 years(around 1980s)2. Do YOU             recall the reaction, or did someone tell you about             it? Yes. Complication from bee sting or poison             ivy3. How long after you took penicillin did the             reaction start? Almost within 8 hours4. What             happened when you took penicillin? Anaphylactic             reaction5. Since your reaction, have you taken             other beta-lactam antibiotics without             reaction? no1. How long ago did you have a             reaction to penicillin? Over 30 years(around             1980s)2. Do YOU recall the reaction, or did             someone tell you about it? Yes. Complication from             bee sting or poison ivy3.   How long after you took penicillin did the reaction start? Almost within 8             hours4. What happened when you took penicillin? Anaphylactic reaction5. Since your reaction,             have you taken other beta-lactam antibiotics             without reaction? no  Penicillins             RASH    Review of Systems:   A comprehensive review of systems was negative. Physical Exam:   There were no vitals taken for this visit. Constitutional Normal Overall appearance - Normal.   Psychiatric Normal Orientation - Oriented to time, place, person & situation. Appropriate mood and affect. Head/Face Normal Facial features -- Normal. Skull - Normal.   Eyes Normal Pupils equal ,round ,react to light and accomidate   Ears Normal External Ear Right: Normal, Left: Normal. Canal - Right: Normal, Left: Normal. TM - Right: Normal, Left: Normal.   Nose Normal External Nose, Normal, Septum -Midline, Right, Left Turbinates - Right: Normal, Hypertrophic Left: Normal, Hypertrophic   Mouth/Throat Normal Lips/teeth/gums - Normal. Tonsils - Normal. Oropharynx - Normal.   Neck Exam Normal Inspection - Normal. Palpation - Normal. Parotid gland - Normal. Thyroid gland - Normal.   Neurological Normal Memory - Normal. Cranial nerves - Cranial nerves II through XII grossly intact. Nasopharynx Normal  Normal        Skin Normal Inspection - Normal.        Lymph Detail Normal Submental. Submandibular. Anterior cervical. Posterior cervical. Supraclavicular. Patients exam showed wax impaction right ear, wax impaction left ear. Ear wax was removed under the operative microscope. No complications. Patient tolerated the procedure well. Ear exam findings listed in note after removal.    Audiogram shows moderate high-frequency loss which appears near symmetric with normal tympanograms.         Results:     Laboratory Data:  Lab Results   Component Value Date    WBC 5.4 07/09/2020    HGB 13.4 07/09/2020    HCT 42.5 07/09/2020    .0 07/09/2020    CREATSERUM 0.95 07/09/2020    BUN 19 (H) 07/09/2020     07/09/2020    K 3.9 07/09/2020     07/09/2020    CO2 32.0 07/09/2020    GLU 83 07/09/2020    CA 9.4 07/09/2020    ALB 3.3 (L) 07/09/2020    ALKPHO 64 07/09/2020    TP 6.5 07/09/2020    AST 18 07/09/2020    ALT 20 07/09/2020    INR 2.50 (H) 03/05/2019    T4F 1.4 09/11/2020    TSH 1.110 09/11/2020    LIP 93 12/07/2019         Imaging:  No results found. Impression:   Patient has eustachian tube dysfunction with moderate sensorineural hearing loss. Recommendations:  She may use Flonase with either Claritin or Allegra seeing if that would pop her ears. If the hearing would worsen she may consider amplification. The patient understands her treatment plan. Thank you for allowing me to participate in the care of your patient.       Roshan Suarez MD  11/6/2023

## 2023-11-24 ENCOUNTER — HOSPITAL ENCOUNTER (OUTPATIENT)
Dept: BONE DENSITY | Age: 88
Discharge: HOME | End: 2023-11-24
Attending: OBSTETRICS & GYNECOLOGY
Payer: MEDICARE

## 2023-11-24 DIAGNOSIS — M81.0 OSTEOPOROSIS: ICD-10-CM

## 2023-11-24 PROCEDURE — 77080 DXA BONE DENSITY AXIAL: CPT | Performed by: OBSTETRICS & GYNECOLOGY
